# Patient Record
Sex: FEMALE | Race: WHITE | NOT HISPANIC OR LATINO | Employment: FULL TIME | URBAN - METROPOLITAN AREA
[De-identification: names, ages, dates, MRNs, and addresses within clinical notes are randomized per-mention and may not be internally consistent; named-entity substitution may affect disease eponyms.]

---

## 2017-01-12 ENCOUNTER — OFFICE VISIT (OUTPATIENT)
Dept: OBSTETRICS AND GYNECOLOGY | Facility: CLINIC | Age: 22
End: 2017-01-12

## 2017-01-12 VITALS
WEIGHT: 181 LBS | DIASTOLIC BLOOD PRESSURE: 70 MMHG | HEIGHT: 63 IN | BODY MASS INDEX: 32.07 KG/M2 | SYSTOLIC BLOOD PRESSURE: 116 MMHG

## 2017-01-12 DIAGNOSIS — N76.0 ACUTE VAGINITIS: Primary | ICD-10-CM

## 2017-01-12 PROCEDURE — 83986 ASSAY PH BODY FLUID NOS: CPT | Performed by: OBSTETRICS & GYNECOLOGY

## 2017-01-12 PROCEDURE — 99212 OFFICE O/P EST SF 10 MIN: CPT | Performed by: OBSTETRICS & GYNECOLOGY

## 2017-01-12 PROCEDURE — 87210 SMEAR WET MOUNT SALINE/INK: CPT | Performed by: OBSTETRICS & GYNECOLOGY

## 2017-01-12 RX ORDER — PAROXETINE 10 MG/1
10 TABLET, FILM COATED ORAL DAILY
Refills: 0 | COMMUNITY
Start: 2016-10-26 | End: 2017-05-04

## 2017-01-12 RX ORDER — METRONIDAZOLE 500 MG/1
500 TABLET ORAL 2 TIMES DAILY
Qty: 14 TABLET | Refills: 1 | Status: SHIPPED | OUTPATIENT
Start: 2017-01-12 | End: 2017-01-19

## 2017-01-12 NOTE — PROGRESS NOTES
"Subjective   Chief Complaint   Patient presents with   • Vaginal Discharge     with odor     Gayla Layne is a 22 y.o. year old  presenting to be seen for evaluation of an abnormal vaginal discharge. The discharge is watery, clear, yellow and odor. She wears a liner daily.  Her symptoms have been present for 8 month(s).  Additional she has noticed odor.    She is sexually active.  In the past 12 months there has not been new sexual partners.  Condoms are not typically used.  She would not like to be screened for STD's at today's exam.     Prior to the onset of symptoms she was not on systemic antibiotics.  She has not recently changed soaps/detergents/toilet tissue.  Prior to this visit, she has used flagyl and UTI antibiotic in an attempt to improve her symptoms. Flagyl helped in April but nopt recently    Current birth control method: IUD - Mirena. 2012 (del 2012)    Past 6 month menstrual and contraceptive history:    No LMP recorded. Patient has had an implant.  Cycle Frequency: absent  regular, predicable and consistent every 28 - 32 days   Menstrual cycle character:  spotting only   Cycle Duration: 1 - 2     {    The following portions of the patient's history were reviewed and updated as appropriate:problem list, current medications and allergies    Review of Systems  Normal bowels and bladder - occas itches     Objective   Visit Vitals   • /70   • Ht 63\" (160 cm)   • Wt 181 lb (82.1 kg)   • LMP Comment: Mirena   • BMI 32.06 kg/m2       General:  well developed; well nourished  no acute distress   Skin:  No suspicious lesions seen   Abdomen: soft, non-tender; no masses  no umbilical or inginual hernias are present  no hepato-splenomegaly   Pelvis: Clinical staff was present for exam  External genitalia:  normal appearance of the external genitalia including Bartholin's and [place]'s glands.  :  urethral meatus normal; urethral hypermobility is absent.  Vaginal:  normal pink " mucosa without prolapse or lesions. discharge present -  watery, white and PH is elevated and clue cells are noted microscopically there is an amine odor;  Cervix:  normal appearance.  Uterus:  normal size, shape and consistency.  Adnexa:  Her bladder is nontender  Rectal:  anus visually normal appearing.     Physical Exam    Lab Review   No data reviewed    Imaging   No data reviewed       Assessment   1. Bacterial vaginosis - chronic or recurrent.  I will treat her for a week and then ask her to use one pill weekly for about 14 weeks to see if this will relieve her symptoms.  Suggest condoms with intercourse.     Plan   1. Flagyl 500 twice a day #14 one refill    Medications Rx this encounter:  New Medications Ordered This Visit   Medications   • PARoxetine (PAXIL) 10 MG tablet     Sig: Take 10 mg by mouth Daily.     Refill:  0   • metroNIDAZOLE (FLAGYL) 500 MG tablet     Sig: Take 1 tablet by mouth 2 (Two) Times a Day for 7 days.     Dispense:  14 tablet     Refill:  1          This note was electronically signed.    Jerrod Muro MD  January 12, 2017

## 2017-01-12 NOTE — MR AVS SNAPSHOT
Gayla Layne   1/12/2017 3:20 PM   Office Visit    Dept Phone:  [phone]   Encounter #:  [id number]    Provider:  Jerrod Muro MD   Department:  [facility] [place]                Your Full Care Plan              Today's Medication Changes          These changes are accurate as of: 1/12/17  4:48 PM.  If you have any questions, ask your nurse or doctor.               New Medication(s)Ordered:     metroNIDAZOLE 500 MG tablet   Commonly known as:  FLAGYL   Take 1 tablet by mouth 2 (Two) Times a Day for 7 days.   Started by:  Jerrod Muro MD            Where to Get Your Medications      These medications were sent to [address] - [address] [place] [address] - [phone] [place] [id number] [address] [address]     Phone:  [phone]     metroNIDAZOLE 500 MG tablet                  Your Updated Medication List          This list is accurate as of: 1/12/17  4:48 PM.  Always use your most recent med list.                metroNIDAZOLE 500 MG tablet   Commonly known as:  FLAGYL   Take 1 tablet by mouth 2 (Two) Times a Day for 7 days.       PARoxetine 10 MG tablet   Commonly known as:  PAXIL               You Were Diagnosed With        Codes Comments    Acute vaginitis    -  Primary ICD-10-CM: N76.0  ICD-9-CM: 616.10       Instructions     None    Patient Instructions History      Upcoming Appointments     Visit Type Date Time Department    GYN FOLLOW UP 1/12/2017  3:20 PM [facility]    [facility]2017  [facility]      Wiren [employer] Signup     [facility] [employer] allows you to send messages to your doctor, view your test results, renew your prescriptions, schedule appointments, and more. To sign up, go to [employer] and click on the Sign Up Now link in the New User? box. Enter your [employer] Activation Code exactly as it appears below along with the last four digits of your Social Security  "Number and your Date of Birth () to complete the sign-up process. If you do not sign up before the expiration date, you must request a new code.    [employer] Activation Code: [email or web address]  Expires: 2017  4:48 PM    If you have questions, you can email [email or web address] or call [phone] to talk to our [employer] staff. Remember, [employer] is NOT to be used for urgent needs. For medical emergencies, dial 911.               Other Info from Your Visit           Your Appointments     2017  3:00 PM EST   Annual with Jerrod Muro MD   [facility] [place] (--)    [address] [place] [address] [address] [place] [id number]   [phone]              Allergies     Amoxicillin      Codeine      Zofran [Ondansetron Hcl]        Reason for Visit     Vaginal Discharge with odor      Vital Signs     Blood Pressure Height Weight Body Mass Index Smoking Status       116/70 63\" (160 cm) 181 lb (82.1 kg) 32.06 kg/m2 Never Smoker       Problems and Diagnoses Noted     Inflammation of vagina    -  Primary        "

## 2017-02-24 ENCOUNTER — OFFICE VISIT (OUTPATIENT)
Dept: OBSTETRICS AND GYNECOLOGY | Facility: CLINIC | Age: 22
End: 2017-02-24

## 2017-02-24 VITALS
WEIGHT: 177 LBS | BODY MASS INDEX: 31.36 KG/M2 | DIASTOLIC BLOOD PRESSURE: 60 MMHG | HEIGHT: 63 IN | SYSTOLIC BLOOD PRESSURE: 106 MMHG

## 2017-02-24 DIAGNOSIS — Z00.00 ANNUAL PHYSICAL EXAM: Primary | ICD-10-CM

## 2017-02-24 DIAGNOSIS — Z30.8 ENCOUNTER FOR OTHER CONTRACEPTIVE MANAGEMENT: ICD-10-CM

## 2017-02-24 PROBLEM — Z97.5 IUD (INTRAUTERINE DEVICE) IN PLACE: Status: ACTIVE | Noted: 2017-02-24

## 2017-02-24 PROCEDURE — 99395 PREV VISIT EST AGE 18-39: CPT | Performed by: OBSTETRICS & GYNECOLOGY

## 2017-02-24 NOTE — PROGRESS NOTES
"Subjective   Chief Complaint   Patient presents with   • Gynecologic Exam     still c/o's BV     Gayla Layne is a 22 y.o. year old  presenting to be seen for her annual exam.    Current birth control method: IUD - Mirena. Since  2012    No LMP recorded. Patient has had an implant.    She is sexually active.   Condoms are not typically used.      Past 6 month menstrual history:    Cycle Frequency: infrequent   More recently    Menstrual cycle character: flow is light   Cycle Duration: 1 - 2   Number of heavy days of flows: 0   Intermenstrual bleeding present: {no   Post-coital bleeding present: no     She exercises regularly: yes  .  Calcium intake: no. Less due to gallstones  She performs self breast exam:no.  She has concerns about domestic violence: no.    The following portions of the patient's history were reviewed and updated as appropriate:problem list, current medications, allergies, past family history, past medical history, past social history and past surgical history.    Review of Systems normal bladder and bowels + gallstones     Objective   Visit Vitals   • /60   • Ht 63\" (160 cm)   • Wt 177 lb (80.3 kg)   • LMP Comment: mirena    • BMI 31.35 kg/m2       General:  well developed; well nourished  no acute distress   Skin:  No suspicious lesions seen   Thyroid: not examined   Breasts:  Examined in supine position  Symmetric without masses or skin dimpling  Nipples normal without inversion, lesions or discharge  There are no palpable axillary nodes   Abdomen: soft, non-tender; no masses  no umbilical or inginual hernias are present  no hepato-splenomegaly   Pelvis: Clinical staff was present for exam  External genitalia:  normal appearance of the external genitalia including Bartholin's and [place]'s glands.  :  urethral meatus normal; urethral hypermobility is absent.  Vaginal:  normal pink mucosa without prolapse or lesions.  Cervix:  normal appearance. IUD string present - " 2.5 cms in length; pap  Uterus:  normal size, shape and consistency.  Adnexa:  normal bimanual exam of the adnexa.  Rectal:  digital rectal exam not performed; anus visually normal appearing.     Lab Review   No data reviewed    Imaging  No data reviewed       Assessment   1. Normal gynecologic examination  2. Mirena IUD in place since 2012 it will need replacement in March and April hopefully we can remove and replace on same day     Plan   1. Follow-up Pap testing  2. She has an appointment regarding possible cholecystectomy with Dr. Pierce  3. After that just taking care of we can have her come back to have her IUD removed and replaced    Medications Rx this encounter:  New Medications Ordered This Visit   Medications   • levonorgestrel (MIRENA, 52 MG,) 20 MCG/24HR IUD     Si each by Intrauterine route 1 (One) Time.           This note was electronically signed.    Jerrod Muro MD  2017

## 2017-03-07 ENCOUNTER — TELEPHONE (OUTPATIENT)
Dept: OBSTETRICS AND GYNECOLOGY | Facility: CLINIC | Age: 22
End: 2017-03-07

## 2017-04-21 ENCOUNTER — LAB REQUISITION (OUTPATIENT)
Dept: LAB | Facility: HOSPITAL | Age: 22
End: 2017-04-21

## 2017-04-21 DIAGNOSIS — K80.20 CALCULUS OF GALLBLADDER WITHOUT CHOLECYSTITIS WITHOUT OBSTRUCTION: ICD-10-CM

## 2017-04-21 LAB
ALBUMIN SERPL-MCNC: 4.7 G/DL (ref 3.2–4.8)
ALBUMIN/GLOB SERPL: 1.4 G/DL (ref 1.5–2.5)
ALP SERPL-CCNC: 68 U/L (ref 25–100)
ALT SERPL W P-5'-P-CCNC: 12 U/L (ref 7–40)
ANION GAP SERPL CALCULATED.3IONS-SCNC: 7 MMOL/L (ref 3–11)
AST SERPL-CCNC: 16 U/L (ref 0–33)
BILIRUB SERPL-MCNC: 0.5 MG/DL (ref 0.3–1.2)
BUN BLD-MCNC: 8 MG/DL (ref 9–23)
BUN/CREAT SERPL: 11.4 (ref 7–25)
CALCIUM SPEC-SCNC: 9.9 MG/DL (ref 8.7–10.4)
CHLORIDE SERPL-SCNC: 103 MMOL/L (ref 99–109)
CO2 SERPL-SCNC: 27 MMOL/L (ref 20–31)
CREAT BLD-MCNC: 0.7 MG/DL (ref 0.6–1.3)
DEPRECATED RDW RBC AUTO: 41.1 FL (ref 37–54)
ERYTHROCYTE [DISTWIDTH] IN BLOOD BY AUTOMATED COUNT: 12.7 % (ref 11.3–14.5)
GFR SERPL CREATININE-BSD FRML MDRD: 105 ML/MIN/1.73
GLOBULIN UR ELPH-MCNC: 3.3 GM/DL
GLUCOSE BLD-MCNC: 76 MG/DL (ref 70–100)
HCT VFR BLD AUTO: 41.3 % (ref 34.5–44)
HGB BLD-MCNC: 14 G/DL (ref 11.5–15.5)
MCH RBC QN AUTO: 30.2 PG (ref 27–31)
MCHC RBC AUTO-ENTMCNC: 33.9 G/DL (ref 32–36)
MCV RBC AUTO: 89.2 FL (ref 80–99)
PLATELET # BLD AUTO: 264 10*3/MM3 (ref 150–450)
PMV BLD AUTO: 10.7 FL (ref 6–12)
POTASSIUM BLD-SCNC: 3.5 MMOL/L (ref 3.5–5.5)
PROT SERPL-MCNC: 8 G/DL (ref 5.7–8.2)
RBC # BLD AUTO: 4.63 10*6/MM3 (ref 3.89–5.14)
SODIUM BLD-SCNC: 137 MMOL/L (ref 132–146)
WBC NRBC COR # BLD: 9.76 10*3/MM3 (ref 3.5–10.8)

## 2017-04-21 PROCEDURE — 80053 COMPREHEN METABOLIC PANEL: CPT | Performed by: SURGERY

## 2017-04-21 PROCEDURE — 85027 COMPLETE CBC AUTOMATED: CPT | Performed by: SURGERY

## 2017-04-24 ENCOUNTER — LAB REQUISITION (OUTPATIENT)
Dept: LAB | Facility: HOSPITAL | Age: 22
End: 2017-04-24

## 2017-04-24 DIAGNOSIS — K80.20 CALCULUS OF GALLBLADDER WITHOUT CHOLECYSTITIS WITHOUT OBSTRUCTION: ICD-10-CM

## 2017-04-24 PROCEDURE — 88304 TISSUE EXAM BY PATHOLOGIST: CPT | Performed by: SURGERY

## 2017-04-25 LAB
CYTO UR: NORMAL
LAB AP CASE REPORT: NORMAL
LAB AP CLINICAL INFORMATION: NORMAL
Lab: NORMAL
PATH REPORT.FINAL DX SPEC: NORMAL
PATH REPORT.GROSS SPEC: NORMAL

## 2017-05-04 ENCOUNTER — OFFICE VISIT (OUTPATIENT)
Dept: OBSTETRICS AND GYNECOLOGY | Facility: CLINIC | Age: 22
End: 2017-05-04

## 2017-05-04 VITALS
SYSTOLIC BLOOD PRESSURE: 100 MMHG | RESPIRATION RATE: 14 BRPM | HEIGHT: 63 IN | BODY MASS INDEX: 30.65 KG/M2 | WEIGHT: 173 LBS | DIASTOLIC BLOOD PRESSURE: 70 MMHG

## 2017-05-04 DIAGNOSIS — Z30.430 ENCOUNTER FOR INSERTION OF INTRAUTERINE CONTRACEPTIVE DEVICE: Primary | ICD-10-CM

## 2017-05-04 PROCEDURE — 58300 INSERT INTRAUTERINE DEVICE: CPT | Performed by: OBSTETRICS & GYNECOLOGY

## 2017-05-22 ENCOUNTER — TELEPHONE (OUTPATIENT)
Dept: OBSTETRICS AND GYNECOLOGY | Facility: CLINIC | Age: 22
End: 2017-05-22

## 2017-05-22 RX ORDER — PHENAZOPYRIDINE HYDROCHLORIDE 100 MG/1
100 TABLET, FILM COATED ORAL 3 TIMES DAILY PRN
Qty: 6 TABLET | Refills: 0 | Status: SHIPPED | OUTPATIENT
Start: 2017-05-22 | End: 2017-05-24

## 2017-06-15 ENCOUNTER — OFFICE VISIT (OUTPATIENT)
Dept: OBSTETRICS AND GYNECOLOGY | Facility: CLINIC | Age: 22
End: 2017-06-15

## 2017-06-15 VITALS
RESPIRATION RATE: 14 BRPM | SYSTOLIC BLOOD PRESSURE: 98 MMHG | DIASTOLIC BLOOD PRESSURE: 60 MMHG | BODY MASS INDEX: 30.29 KG/M2 | WEIGHT: 171 LBS

## 2017-06-15 DIAGNOSIS — Z30.431 IUD CHECK UP: Primary | ICD-10-CM

## 2017-06-15 PROCEDURE — 99212 OFFICE O/P EST SF 10 MIN: CPT | Performed by: OBSTETRICS & GYNECOLOGY

## 2017-06-15 RX ORDER — CITALOPRAM 20 MG/1
20 TABLET ORAL DAILY
COMMUNITY
End: 2018-11-28

## 2017-06-15 NOTE — PROGRESS NOTES
Subjective   Chief Complaint   Patient presents with   • Contraception     mirena placement check     Gayla Layne is a 22 y.o. year old .  No LMP recorded. Patient has had an implant.  She presents to be seen because of IUD check up. She had a UTI -seen in ER then 1* care 4 days later and changed to Bactrim from cipro and monodox after Rocephin at ER.  No symptoms now  Current birth control method: IUD - Mirena.    Past 6 month menstrual and contraceptive history:    No LMP recorded. Patient has had an implant.  Cycle Frequency: regular, predictable and consistent every 28 - 32 days  irregular   Menstrual cycle character: flow has been absent with the IUD   Cycle Duration: 0 - 0                   The following portions of the patient's history were reviewed and updated as appropriate:current medications, allergies and past surgical history    Review of Systems normal bladder now and bowels     Objective   BP 98/60  Resp 14  Wt 171 lb (77.6 kg)  BMI 30.29 kg/m2    General:  well developed; well nourished  no acute distress   Skin:  No suspicious lesions seen   Thyroid: normal to inspection and palpation   Lungs:  breathing is unlabored   Heart:  Not performed.   Abdomen: soft, non-tender; no masses  no umbilical or inginual hernias are present  no hepato-splenomegaly   Pelvis: Clinical staff was present for exam  External genitalia:  normal appearance of the external genitalia including Bartholin's and [place]'s glands.  :  urethral meatus normal; urethral hypermobility is absent.  Vaginal:  normal pink mucosa without prolapse or lesions. discharge present -  white;  Cervix:  normal appearance. IUD string present - 2.5 cms in length;  Uterus:  normal size, shape and consistency.  Adnexa:  normal bimanual exam of the adnexa.  Rectal:  digital rectal exam not performed; anus visually normal appearing.     Lab Review   No data reviewed    Imaging   Pelvic ultrasound report       Assessment   Mirena  IUD in place                         Recent UTI resolved     Plan   1. Reassured that on exam and ultrasound IUD is in place.  2. Plenty of fluids and regular voiding to hopefully avoid UTI.  3. Exercise calcium yearly exams    Medications Rx this encounter:  New Medications Ordered This Visit   Medications   • citalopram (CeleXA) 20 MG tablet     Sig: Take 20 mg by mouth Daily.          This note was electronically signed.    Jerrod Muro MD  German[place] 15, 2017

## 2018-08-26 ENCOUNTER — HOSPITAL ENCOUNTER (EMERGENCY)
Facility: HOSPITAL | Age: 23
Discharge: HOME | End: 2018-08-26
Attending: EMERGENCY MEDICINE | Admitting: EMERGENCY MEDICINE

## 2018-08-26 ENCOUNTER — APPOINTMENT (OUTPATIENT)
Dept: GENERAL RADIOLOGY | Facility: HOSPITAL | Age: 23
End: 2018-08-26

## 2018-08-26 VITALS
DIASTOLIC BLOOD PRESSURE: 78 MMHG | HEIGHT: 63 IN | TEMPERATURE: 98.9 F | WEIGHT: 180 LBS | BODY MASS INDEX: 31.89 KG/M2 | OXYGEN SATURATION: 100 % | HEART RATE: 114 BPM | RESPIRATION RATE: 20 BRPM | SYSTOLIC BLOOD PRESSURE: 128 MMHG

## 2018-08-26 DIAGNOSIS — S89.91XA RIGHT KNEE INJURY, INITIAL ENCOUNTER: Primary | ICD-10-CM
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
## 2018-08-26 DIAGNOSIS — S86.911A KNEE STRAIN, RIGHT, INITIAL ENCOUNTER: ICD-10-CM

## 2018-08-26 PROCEDURE — 99283 EMERGENCY DEPT VISIT LOW MDM: CPT

## 2018-08-26 PROCEDURE — 73560 X-RAY EXAM OF KNEE 1 OR 2: CPT

## 2018-08-26 PROCEDURE — 25010000002 KETOROLAC TROMETHAMINE PER 15 MG: Performed by: EMERGENCY MEDICINE

## 2018-08-26 PROCEDURE — 96372 THER/PROPH/DIAG INJ SC/IM: CPT

## 2018-08-26 RX ORDER — ACETAMINOPHEN 500 MG
1000 TABLET ORAL ONCE
Status: COMPLETED | OUTPATIENT
Start: 2018-08-26 | End: 2018-08-26

## 2018-08-26 RX ORDER — NAPROXEN 500 MG/1
500 TABLET ORAL 2 TIMES DAILY WITH MEALS
Qty: 14 TABLET | Refills: 0 | Status: SHIPPED | OUTPATIENT
Start: 2018-08-26 | End: 2018-11-28

## 2018-08-26 RX ORDER — KETOROLAC TROMETHAMINE 15 MG/ML
15 INJECTION, SOLUTION INTRAMUSCULAR; INTRAVENOUS ONCE
Status: COMPLETED | OUTPATIENT
Start: 2018-08-26 | End: 2018-08-26

## 2018-08-26 RX ADMIN — ACETAMINOPHEN 1000 MG: 500 TABLET, FILM COATED ORAL at 21:37

## 2018-08-26 RX ADMIN — KETOROLAC TROMETHAMINE 15 MG: 15 INJECTION, SOLUTION INTRAMUSCULAR; INTRAVENOUS at 21:37

## 2018-11-13 ENCOUNTER — TELEPHONE (OUTPATIENT)
Dept: OBSTETRICS AND GYNECOLOGY | Facility: CLINIC | Age: 23
End: 2018-11-13

## 2018-11-13 RX ORDER — FLUCONAZOLE 150 MG/1
150 TABLET ORAL DAILY
Qty: 1 TABLET | Refills: 0 | Status: SHIPPED | OUTPATIENT
Start: 2018-11-13 | End: 2018-11-28

## 2018-11-28 ENCOUNTER — OFFICE VISIT (OUTPATIENT)
Dept: OBSTETRICS AND GYNECOLOGY | Facility: CLINIC | Age: 23
End: 2018-11-28

## 2018-11-28 ENCOUNTER — APPOINTMENT (OUTPATIENT)
Dept: LAB | Facility: HOSPITAL | Age: 23
End: 2018-11-28

## 2018-11-28 VITALS
WEIGHT: 175 LBS | BODY MASS INDEX: 31.01 KG/M2 | DIASTOLIC BLOOD PRESSURE: 60 MMHG | SYSTOLIC BLOOD PRESSURE: 112 MMHG | HEIGHT: 63 IN

## 2018-11-28 DIAGNOSIS — A63.0 CONDYLOMA: ICD-10-CM

## 2018-11-28 DIAGNOSIS — Z20.2 EXPOSURE TO STD: ICD-10-CM

## 2018-11-28 DIAGNOSIS — Z01.419 ENCOUNTER FOR WELL WOMAN EXAM WITH ROUTINE GYNECOLOGICAL EXAM: Primary | ICD-10-CM

## 2018-11-28 DIAGNOSIS — Z97.5 IUD (INTRAUTERINE DEVICE) IN PLACE: ICD-10-CM

## 2018-11-28 PROBLEM — Z90.49 HX OF CHOLECYSTECTOMY: Status: ACTIVE | Noted: 2018-11-28

## 2018-11-28 LAB — HIV1+2 AB SER QL: NORMAL

## 2018-11-28 PROCEDURE — 86592 SYPHILIS TEST NON-TREP QUAL: CPT | Performed by: OBSTETRICS & GYNECOLOGY

## 2018-11-28 PROCEDURE — 36415 COLL VENOUS BLD VENIPUNCTURE: CPT | Performed by: OBSTETRICS & GYNECOLOGY

## 2018-11-28 PROCEDURE — 56501 DESTROY VULVA LESIONS SIM: CPT | Performed by: OBSTETRICS & GYNECOLOGY

## 2018-11-28 PROCEDURE — G0432 EIA HIV-1/HIV-2 SCREEN: HCPCS | Performed by: OBSTETRICS & GYNECOLOGY

## 2018-11-28 PROCEDURE — 99395 PREV VISIT EST AGE 18-39: CPT | Performed by: OBSTETRICS & GYNECOLOGY

## 2018-11-28 RX ORDER — PHENTERMINE HYDROCHLORIDE 37.5 MG/1
TABLET ORAL
Refills: 0 | COMMUNITY
Start: 2018-11-26 | End: 2019-09-30

## 2018-11-28 NOTE — PROGRESS NOTES
"Subjective   Chief Complaint   Patient presents with   • Annual Exam     vag discharge     Gayla Layne is a 23 y.o. year old  presenting to be seen for her annual exam.    Current birth control method: IUD - Mirena.    No LMP recorded. Patient has had an implant.    She is sexually active.   Condoms are not typically used.  She reports her ex-boyfriend said he was exposed to condyloma.  She had previously been together with him about 2 years and I believe had a child with him.  I then broke up he had been with other women and then they got back together again.  She reports that she is pretty much through with him.  She has noticed some bumps.  Last sexually active maybe\" August not entirely clear when these for showed up.  She had read online about condyloma.    Past 6 month menstrual history:             She has maybe 1 or 2 days of dark brown spotting monthly no cramping or pain.  With her first IUD she didn't really bleed at all wonders if this is normal.  Reassured her that it is.                            She exercises regularly: yes.  Calcium intake: is not} adequate.  Caffeine intake:small  She performs self breast exam:no.  She has concerns about domestic violence: no.    The following portions of the patient's history were reviewed and updated as appropriate:problem list, current medications, allergies, past family history, past medical history, past social history and past surgical history.    Review of Systems    normal bladder and bowels  Objective   /60   Ht 159.4 cm (62.75\")   Wt 79.4 kg (175 lb)   BMI 31.25 kg/m²      General:  well developed; well nourished  no acute distress  appears stated age   Skin:  No suspicious lesions seen   Thyroid:    Breasts:  Examined in supine position  Symmetric without masses or skin dimpling  Nipples normal without inversion, lesions or discharge  There are no palpable axillary nodes   Abdomen: no umbilical or inginual hernias are present  no " hepato-splenomegaly  Slightly tender right side but no masses.   Pelvis: Clinical staff was present for exam  External genitalia:  condyloma present Mainly in the perineum.  2 pigmented lesions right vulva and one single perianally at about 12:00.  All treated with bichloracetic acid covered with Vaseline;  :  urethral meatus normal;  Vaginal:  normal pink mucosa without prolapse or lesions.  Cervix:  normal appearance. IUD string present - 3.5 cms in length; Slight yellow brown mucous at os  Uterus:  normal size, shape and consistency.  Adnexa:  normal bimanual exam of the adnexa.  Rectal:  digital rectal exam not performed; anus visually normal appearing.     Lab Review   No data reviewed    Imaging  No data reviewed       Assessment   1. Normal GYN examination with exception of;  2. 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  We'll give her information regarding HPV.  We will do STD screening from her Pap testing in encourage HIV and RPR testing condom use in the future with new partners  3. IUD and placed a little over a year.  Spotting associated with this.     Plan   1. 1000 mg calcium in divided doses ideally in diet; regular exercise  2. Self breast awareness and mammogram protocols discussed.  3. Annual examination or sooner as needed  4. Follow-up Pap testing and STD screening.  5. When necessary for condyloma treatment if necessary could try Condylox      Medications Rx this encounter:  No orders of the defined types were placed in this encounter.         This note was electronically signed.    Jerrod Muro MD  11/28/2018

## 2018-11-30 LAB — RPR SER QL: NORMAL

## 2019-03-05 ENCOUNTER — TELEPHONE (OUTPATIENT)
Dept: OBSTETRICS AND GYNECOLOGY | Facility: CLINIC | Age: 24
End: 2019-03-05

## 2019-03-05 NOTE — TELEPHONE ENCOUNTER
PT I S CALLING ADVISING HER PHARMACY STATES THAT HER INSURANCE WONT PAY FOR THE CONDYLOX GEL THAT SHE NEEDS A PA - I ADVISED JAIDEN WOULD TAKE CARE OF THIS  SHE IS ALSO STATING THAT SHE NEEDS A SOLUTION TO GO WITH THIS - SHE IS NOT SURE WHAT THE NAME IS -

## 2019-03-11 ENCOUNTER — TELEPHONE (OUTPATIENT)
Dept: OBSTETRICS AND GYNECOLOGY | Facility: CLINIC | Age: 24
End: 2019-03-11

## 2019-03-12 ENCOUNTER — TELEPHONE (OUTPATIENT)
Dept: OBSTETRICS AND GYNECOLOGY | Facility: CLINIC | Age: 24
End: 2019-03-12

## 2019-03-12 RX ORDER — PODOFILOX 5 MG/ML
SOLUTION TOPICAL 2 TIMES DAILY
Qty: 1 EACH | Refills: 1 | Status: SHIPPED | OUTPATIENT
Start: 2019-03-12 | End: 2019-09-30

## 2019-09-19 ENCOUNTER — TELEPHONE (OUTPATIENT)
Dept: OBSTETRICS AND GYNECOLOGY | Facility: CLINIC | Age: 24
End: 2019-09-19

## 2019-09-30 ENCOUNTER — OFFICE VISIT (OUTPATIENT)
Dept: OBSTETRICS AND GYNECOLOGY | Facility: CLINIC | Age: 24
End: 2019-09-30

## 2019-09-30 VITALS
WEIGHT: 157 LBS | BODY MASS INDEX: 28.03 KG/M2 | DIASTOLIC BLOOD PRESSURE: 70 MMHG | SYSTOLIC BLOOD PRESSURE: 116 MMHG | RESPIRATION RATE: 16 BRPM

## 2019-09-30 DIAGNOSIS — Z97.5 IUD (INTRAUTERINE DEVICE) IN PLACE: ICD-10-CM

## 2019-09-30 DIAGNOSIS — A63.0 CONDYLOMA: ICD-10-CM

## 2019-09-30 DIAGNOSIS — N76.0 ACUTE VAGINITIS: Primary | ICD-10-CM

## 2019-09-30 PROCEDURE — 83986 ASSAY PH BODY FLUID NOS: CPT | Performed by: OBSTETRICS & GYNECOLOGY

## 2019-09-30 PROCEDURE — 99213 OFFICE O/P EST LOW 20 MIN: CPT | Performed by: OBSTETRICS & GYNECOLOGY

## 2019-09-30 PROCEDURE — 87210 SMEAR WET MOUNT SALINE/INK: CPT | Performed by: OBSTETRICS & GYNECOLOGY

## 2019-09-30 RX ORDER — BUSPIRONE HYDROCHLORIDE 7.5 MG/1
TABLET ORAL
Refills: 0 | COMMUNITY
Start: 2019-09-11 | End: 2020-03-03

## 2019-09-30 RX ORDER — METRONIDAZOLE 500 MG/1
500 TABLET ORAL 2 TIMES DAILY
Qty: 14 TABLET | Refills: 0 | Status: SHIPPED | OUTPATIENT
Start: 2019-09-30 | End: 2019-10-07

## 2019-09-30 NOTE — PROGRESS NOTES
Subjective   Chief Complaint   Patient presents with   • Vaginal Discharge   • Vaginal Itching   • Abdominal Pain     left side     Gayla Layne is a 24 y.o. year old  presenting to be seen for evaluation of an abnormal vaginal discharge. The discharge is watery and white.  Has a slight odor and itch is been there for about a week or so.  She is doing well with her IUD with minimal flow.  No real cramping.  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    She is sexually active.  In the past 12 months there has not been new sexual partners.  Condoms ARE typically used.  Having some pain may be at the introitus.  She did have a second-degree tear but that was 7 years ago  She may have 1 or 2 small condyloma.  These been treated with Condylox in the past.    Current birth control method: IUD - Mirena    No LMP recorded. Patient has had an implant.  Cycle Frequency: unpredictable   Menstrual cycle character: flow is typically light   Cycle Duration: 1 - 2                 The following portions of the patient's history were reviewed and updated as appropriate:problem list, current medications and allergies    Current Outpatient Medications:   •  busPIRone (BUSPAR) 7.5 MG tablet, , Disp: , Rfl: 0  •  levonorgestrel (MIRENA, 52 MG,) 20 MCG/24HR IUD, 1 each by Intrauterine route 1 (One) Time. , Disp: , Rfl:   •  metroNIDAZOLE (FLAGYL) 500 MG tablet, Take 1 tablet by mouth 2 (Two) Times a Day for 7 days., Disp: 14 tablet, Rfl: 0  Review of Systems normal bladdder and bowels     Objective   /70   Resp 16   Wt 71.2 kg (157 lb)   Breastfeeding? No   BMI 28.03 kg/m²     General:  well developed; well nourished  no acute distress  appears stated age   Skin:  No suspicious lesions seen   Abdomen: no umbilical or inguinal hernias are present  no hepato-splenomegaly   Soft sl tender on left side no rebound   Pelvis: Clinical staff was present for exam  External genitalia:  normal appearance  of the external genitalia including Bartholin's and [place]'s glands. There are couple of skin tags on the right labia and may be 2 very small condyloma on the perineum  :  urethral meatus normal;  Vaginal:  normal pink mucosa without prolapse or lesions. discharge present -  white and thick; pH = 5 wet prep done: clue cells are Present but mild;  Cervix:  normal appearance.  Uterus:  normal size, shape and consistency. anteverted;  Adnexa:  normal bimanual exam of the adnexa.   IUDs during present       Lab Review   Pap test and STD swabs for GC, chlamydia, trichimoniasis and HIV's RPR  Imaging   Pelvic ultrasound report       Assessment   1. Mild BV.  2. Mild condyloma could treat at home with Condylox follow-up at yearly in November     Plan   1. Follow-up November yearly    No orders of the defined types were placed in this encounter.    New Medications Ordered This Visit   Medications   • metroNIDAZOLE (FLAGYL) 500 MG tablet     Sig: Take 1 tablet by mouth 2 (Two) Times a Day for 7 days.     Dispense:  14 tablet     Refill:  0         This note was electronically signed.    Jerrod Muro MD  September 30, 2019

## 2019-10-10 RX ORDER — FLUCONAZOLE 150 MG/1
150 TABLET ORAL DAILY
Qty: 1 TABLET | Refills: 0 | Status: SHIPPED | OUTPATIENT
Start: 2019-10-10 | End: 2020-03-03

## 2020-03-03 ENCOUNTER — OFFICE VISIT (OUTPATIENT)
Dept: OBSTETRICS AND GYNECOLOGY | Facility: CLINIC | Age: 25
End: 2020-03-03

## 2020-03-03 VITALS
DIASTOLIC BLOOD PRESSURE: 70 MMHG | BODY MASS INDEX: 31.18 KG/M2 | WEIGHT: 176 LBS | HEIGHT: 63 IN | SYSTOLIC BLOOD PRESSURE: 112 MMHG

## 2020-03-03 DIAGNOSIS — A63.0 CONDYLOMA: ICD-10-CM

## 2020-03-03 DIAGNOSIS — Z01.411 ENCOUNTER FOR GYNECOLOGICAL EXAMINATION WITH ABNORMAL FINDING: Primary | ICD-10-CM

## 2020-03-03 PROBLEM — N91.1 AMENORRHEA, SECONDARY: Status: ACTIVE | Noted: 2020-03-03

## 2020-03-03 PROCEDURE — 56501 DESTROY VULVA LESIONS SIM: CPT | Performed by: OBSTETRICS & GYNECOLOGY

## 2020-03-03 PROCEDURE — 99395 PREV VISIT EST AGE 18-39: CPT | Performed by: OBSTETRICS & GYNECOLOGY

## 2020-03-03 RX ORDER — MONTELUKAST SODIUM 4 MG/1
TABLET, CHEWABLE ORAL
COMMUNITY
Start: 2020-02-18 | End: 2022-08-25 | Stop reason: SDUPTHER

## 2020-03-03 RX ORDER — BUSPIRONE HYDROCHLORIDE 10 MG/1
TABLET ORAL
COMMUNITY
Start: 2020-02-18 | End: 2021-03-04

## 2020-03-03 NOTE — PROGRESS NOTES
Subjective   Chief Complaint   Patient presents with   • Annual Exam   • Vaginal Discharge     Gayla Layne is a 25 y.o. year old  presenting to be seen for her annual exam.    Current birth control method: IUD - Mirena.  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  She did have some left lower quadrant pain and was wondering if this was ovulation.  Could be had asked her to chart her cycles to see if this recurs about once a month.  Other issues she has had some problems with bowel spasm or diarrhea after her gallbladder was removed in 2017.  Recently underwent a colonoscopy maybe endoscopy and has been placed on Colestid to help firm up her stool.  She is the Condylox after we treated her condyloma last visit.  She has had a few that persisted most when aware got smaller.  One in particular is bothersome wonders if these could be treated again today.    No LMP recorded. Patient has had an implant.    She is sexually active.   Condoms are not typically used.    Kerr is painful or she is having problems :no  She has concerns about domestic violence: no.    Cycle Frequency: absent                         She exercises regularly: yes.  Self breast awareness:not asked    Calcium intake: is not adequate.2  Caffeine intake: caffeine use is moderate-to-high daily  Social History    Tobacco Use      Smoking status: Never Smoker      Smokeless tobacco: Never Used    Social History     Substance and Sexual Activity   Alcohol Use No        The following portions of the patient's history were reviewed and updated as is  appropriate:problem list, current medications, allergies, past family history, past medical history, past social history and past surgical history.    Current Outpatient Medications:   •  busPIRone (BUSPAR) 10 MG tablet, , Disp: , Rfl:   •  colestipol (COLESTID) 1 g tablet, , Disp: , Rfl:   •  levonorgestrel (MIRENA, 52 MG,) 20 MCG/24HR IUD, 1 each by Intrauterine route 1 (One) Time. , Disp: , Rfl:  "    Review of systems  Constitutional    POS nothing reported                            NEG anorexia, night sweats or weight gain  Breast                POS breast tenderness on right                            NEG persistent breast lump, skin dimpling or nipple discharge  GI                      POS diarrhea and loose since GB removed - on colestid now                            NEG bloating, melena or reflux symptoms                       POS nothing reported                            NEG dysuria, frequency or hematuria       Objective   /70   Ht 159.4 cm (62.75\")   Wt 79.8 kg (176 lb)   Breastfeeding No   BMI 31.43 kg/m²       General:  well developed; well nourished  no acute distress  appears stated age   Skin:  No suspicious lesions seen   Thyroid:    Breasts:  Examined in supine position  Symmetric without masses or skin dimpling  Nipples normal without inversion, lesions or discharge   Abdomen: soft, non-tender; no masses  no umbilical or inguinal hernias are present  no hepato-splenomegaly   Pelvis: Clinical staff was present for exam  External genitalia:  condyloma present In the perineum.  About 4-5 of these 1 on the left side looks more like a skin tag.  Otherwise normal external genitalia;  :  urethral meatus normal;  Vaginal:  normal pink mucosa without prolapse or lesions.  Uterus:  normal size, shape and consistency.  Adnexa:  normal bimanual exam of the adnexa.  Rectal:  digital rectal exam not performed; anus visually normal appearing.  The above condyloma treated bichloracetic acid and covered with Vaseline     Lab Review   Pap test and STD swabs for GC, chlamydia and trichimoniasis HIV RPR    Imaging  Pelvic ultrasound report       Assessment     1. Normal examination with exception of  2. Perineal condyloma treated today with BCA  3. Mirena IUD in place with amenorrhea  4. Loose stools currently on Colestid             Plan     1. Annual examination or sooner as needed; if the " skin tag persist could consider local and excision with pencil cautery  2. 1000 mg calcium in divided doses ideally in diet; regular exercise  3. Return PRN above              No orders of the defined types were placed in this encounter.    No orders of the defined types were placed in this encounter.          This note was electronically signed.    Jerrod Muro MD  3/3/2020

## 2020-06-26 ENCOUNTER — TELEMEDICINE (OUTPATIENT)
Dept: FAMILY MEDICINE CLINIC | Facility: TELEHEALTH | Age: 25
End: 2020-06-26

## 2020-06-26 DIAGNOSIS — R10.2 PELVIC PAIN IN FEMALE: Primary | ICD-10-CM

## 2020-06-26 PROCEDURE — 99213 OFFICE O/P EST LOW 20 MIN: CPT | Performed by: NURSE PRACTITIONER

## 2020-06-26 NOTE — PROGRESS NOTES
Gayla Layne    1995  [id number]    I have reviewed the e-Visit questionnaire and patient's answers, my assessment and plan are as follows:    Questionnaire:   --symptoms: No COVID 19 symptoms  --exposure to flu or COVID-19 in past 14 days:  None`  --sudden loss of taste or smell:  None reported     Chief Complaint:  Pelvic pain     HPI  Gayla Layne is a 26 yo female who has complaints of stabbing pelvic pain that comes and goes and vaginal discharge that has increased and become malodorous over the past week. She does have an IUD and reports not pregnant and only one sexual partner.     Review of Systems - Genito-Urinary ROS: negative for - change in urinary stream or genital ulcers    No PE completed     Diagnoses and all orders for this visit:    Pelvic pain in female        Any medications prescribed have been sent electronically to   [id number] - [place], [place] - [address] AT [facility] - [phone]  - [phone]   [address] [place] [address]  Phone: [phone] Fax: [phone]      Time Documentation  Counseled patient  Counseling topics: She needs to be evaluated at the emergency department for pain.   Total encounter time: counseling time more than 50% of visit: 7 minutes    I have advised her to go to the emergency room for evaluation for the pelvic pain. She verbalizes understanding.     SHANTI Gallagher  06/26/20  6:15 PM    This is a Televisit.

## 2020-06-30 ENCOUNTER — TELEPHONE (OUTPATIENT)
Dept: OBSTETRICS AND GYNECOLOGY | Facility: CLINIC | Age: 25
End: 2020-06-30

## 2020-06-30 NOTE — TELEPHONE ENCOUNTER
My , Fátima Meier, called Gayla yesterday on June 29 to follow-up note I received regarding her complaining of some pelvic pain on the 26th.  She was urged to go the emergency room.  However, according to the Fátima, Gayla did not feel bad enough to go to the emergency room and her pain had improved.  Ask her to follow-up with me if this persists.

## 2021-01-06 ENCOUNTER — IMMUNIZATION (OUTPATIENT)
Dept: VACCINE CLINIC | Facility: HOSPITAL | Age: 26
End: 2021-01-06

## 2021-01-06 PROCEDURE — 0001A: CPT | Performed by: INTERNAL MEDICINE

## 2021-01-06 PROCEDURE — 91300 HC SARSCOV02 VAC 30MCG/0.3ML IM: CPT | Performed by: INTERNAL MEDICINE

## 2021-01-27 ENCOUNTER — IMMUNIZATION (OUTPATIENT)
Dept: VACCINE CLINIC | Facility: HOSPITAL | Age: 26
End: 2021-01-27

## 2021-01-27 PROCEDURE — 91300 HC SARSCOV02 VAC 30MCG/0.3ML IM: CPT | Performed by: INTERNAL MEDICINE

## 2021-01-27 PROCEDURE — 0002A: CPT | Performed by: INTERNAL MEDICINE

## 2021-03-04 ENCOUNTER — OFFICE VISIT (OUTPATIENT)
Dept: OBSTETRICS AND GYNECOLOGY | Facility: CLINIC | Age: 26
End: 2021-03-04

## 2021-03-04 VITALS
SYSTOLIC BLOOD PRESSURE: 110 MMHG | DIASTOLIC BLOOD PRESSURE: 60 MMHG | BODY MASS INDEX: 37.21 KG/M2 | HEIGHT: 63 IN | WEIGHT: 210 LBS

## 2021-03-04 DIAGNOSIS — Z01.419 ENCOUNTER FOR WELL WOMAN EXAM WITH ROUTINE GYNECOLOGICAL EXAM: Primary | ICD-10-CM

## 2021-03-04 PROCEDURE — 99395 PREV VISIT EST AGE 18-39: CPT | Performed by: OBSTETRICS & GYNECOLOGY

## 2021-03-04 RX ORDER — HYDROXYZINE HYDROCHLORIDE 10 MG/1
TABLET, FILM COATED ORAL
COMMUNITY
Start: 2020-12-24 | End: 2021-12-25

## 2021-03-04 RX ORDER — VALACYCLOVIR HYDROCHLORIDE 1 G/1
TABLET, FILM COATED ORAL
COMMUNITY
Start: 2021-02-21 | End: 2022-12-30

## 2021-03-04 RX ORDER — CARIPRAZINE 1.5 MG/1
CAPSULE, GELATIN COATED ORAL
COMMUNITY
Start: 2021-02-15 | End: 2022-12-30

## 2021-03-04 RX ORDER — LISDEXAMFETAMINE DIMESYLATE 30 MG/1
CAPSULE ORAL
COMMUNITY
Start: 2021-03-03 | End: 2021-04-01

## 2021-03-04 RX ORDER — PHENOL 1.4 %
600 AEROSOL, SPRAY (ML) MUCOUS MEMBRANE 2 TIMES DAILY
COMMUNITY
End: 2022-12-30

## 2021-03-04 NOTE — PROGRESS NOTES
Subjective   Chief Complaint   Patient presents with   • Annual Exam     Gayla Layne is a 26 y.o. year old  presenting to be seen for her annual exam.    Current birth control method: IUD - Mirena. 2017; discussed that the indication for birth control has been increased from 5 to 6 years of this will be good until .  She remembers never some discomfort during placement had usual alcohol etc.  She has had 1 vaginal delivery.  I suggest that we give her Cytotec and Motrin before her next IUD is removed and replaced.  Could consider a Kyleena which is a little smaller in diameter and effective for 5 years.    She is currently in nursing school.  She wondered about the cyst noted on her ultrasound with the after the placement I reviewed that and this appears to be a paraovarian cyst only 1.4 cm and I reassured her that this should not cause any significant pain.  She also had mildly polycystic appearing ovaries bilaterally they were not enlarged in size.    No LMP recorded. Patient has had an implant.    She is sexually active.   Condoms are not typically used.    STDs and sexual behavior discussed.  [place] is painful or she is having problems :no  She has concerns about domestic violence: no.    Cycle Frequency: unpredictable  irregular   Menstrual cycle character: flow is typically light   Cycle Duration: 0 - 1   Number of heavy days of flows: 0   Intermenstrual bleeding present: no   Post-coital bleeding present: no     She exercises regularly: no.  Discussed maintaining healthy weight and nutrition and injury avoidance  Self breast awareness:no    Calcium intake: is not adequate.1  Caffeine intake: caffeine use is mild to moderate depending on whether or not Starbucks coffee espresso.  Suggested having coffee at home to save money and less caffeine regarding breast tenderness.  Social History    Tobacco Use      Smoking status: Never Smoker      Smokeless tobacco: Never Used    Social  "History     Substance and Sexual Activity   Alcohol Use No      Discussed avoidance of illicit drugs    The following portions of the patient's history were reviewed and updated as is  appropriate:problem list, current medications, allergies, past family history, past medical history, past social history and past surgical history.    Current Outpatient Medications:   •  colestipol (COLESTID) 1 g tablet, , Disp: , Rfl:   •  hydrOXYzine (ATARAX) 10 MG tablet, TAKE 1 TO 2 TABLETS BY MOUTH TWICE DAILY AS NEEDED FOR ANXIETY, Disp: , Rfl:   •  levonorgestrel (MIRENA, 52 MG,) 20 MCG/24HR IUD, 1 each by Intrauterine route 1 (One) Time. 2012, Disp: , Rfl:   •  valACYclovir (VALTREX) 1000 MG tablet, TAKE 2 TABLETS TWICE DAILY BY MOUTH FOR 3 DAYS, Disp: , Rfl:   •  Vraylar 1.5 MG capsule capsule, TAKE 1 CAPSULE BY MOUTH EVERY MORNING AS DIRECTED, Disp: , Rfl:   •  Vyvanse 30 MG capsule, , Disp: , Rfl:     Discussed risk factors for hypertension, hyperlipidemia coronary heart disease.    Review of systems    Constitutional    POS weight gain                            NEG anorexia, chills, fatigue or night sweats  Breast                POS breast tenderness                            NEG persistent breast lump, skin dimpling or nipple discharge  GI                      POS constipation (chronic) and diarrhea                            NEG bloating, change in bowel habits, melena or reflux symptoms                       POS nothing reported                            NEG dysuria, frequency or hematuria         Objective   /60   Ht 158.8 cm (62.5\")   Wt 95.3 kg (210 lb)   Breastfeeding No   BMI 37.80 kg/m²       General:  well developed; well nourished  no acute distress  appears stated age   Skin:  No suspicious lesions seen   Thyroid:    Breasts:  Examined in supine position  Symmetric without masses or skin dimpling  Nipples normal without inversion, lesions or discharge  Fibrocystic changes are present both " breasts without a discrete mass   Abdomen: soft, non-tender; no masses  no umbilical or inguinal hernias are present  no hepato-splenomegaly   Pelvis: Clinical staff was present for exam  External genitalia:  normal appearance of the external genitalia including Bartholin's and Bentonia's glands.  :  urethral meatus normal;  Vaginal:  normal pink mucosa without prolapse or lesions.  Cervix:  normal appearance. Pap obtained IUD string present - 3 cms in length;  Uterus:  normal size, shape and consistency. anteverted;  Adnexa:  normal bimanual exam of the adnexa.  Rectal:  digital rectal exam not performed; anus visually normal appearing.       Lab Review   Pap test    Imaging  Pelvic ultrasound report  Pelvic ultrasound images independantly reviewed - Revealed normal placement of IUD and ovaries as described above               Assessment     1. Normal GYN examination  2. Hypomenorrhea associated with IUD  3. Weight gain since last year associated with binge eating possibly associate with stress of nursing school.  She is now on Vyvanse.             Plan     1. Annual examination or sooner as needed; follow-up Pap testing results, condoms if new partners.  2. 1000 mg calcium in divided doses ideally in diet; regular exercise  3. I would suggest she cut back on Starbucks coffee as this may be causing some of her breast tenderness  4.                No orders of the defined types were placed in this encounter.    No orders of the defined types were placed in this encounter.          This note was electronically signed.    Jerrod Muro MD  3/4/2021

## 2021-03-09 ENCOUNTER — TELEPHONE (OUTPATIENT)
Dept: OBSTETRICS AND GYNECOLOGY | Facility: CLINIC | Age: 26
End: 2021-03-09

## 2021-03-09 PROBLEM — R87.810 ATYPICAL SQUAMOUS CELL CHANGES OF UNDETERMINED SIGNIFICANCE (ASCUS) ON CERVICAL CYTOLOGY WITH POSITIVE HIGH RISK HUMAN PAPILLOMA VIRUS (HPV): Status: ACTIVE | Noted: 2021-03-09

## 2021-03-09 PROBLEM — R87.610 ATYPICAL SQUAMOUS CELL CHANGES OF UNDETERMINED SIGNIFICANCE (ASCUS) ON CERVICAL CYTOLOGY WITH POSITIVE HIGH RISK HUMAN PAPILLOMA VIRUS (HPV): Status: ACTIVE | Noted: 2021-03-09

## 2021-03-10 NOTE — TELEPHONE ENCOUNTER
Spoke with Gayla concerning her questions regarding her PAP results and Dr Muro's message via [employer]. Pt desires to proceed with Colpo. Scheduled for 4/1/21.

## 2021-04-01 ENCOUNTER — OFFICE VISIT (OUTPATIENT)
Dept: OBSTETRICS AND GYNECOLOGY | Facility: CLINIC | Age: 26
End: 2021-04-01

## 2021-04-01 VITALS
RESPIRATION RATE: 16 BRPM | DIASTOLIC BLOOD PRESSURE: 60 MMHG | SYSTOLIC BLOOD PRESSURE: 112 MMHG | WEIGHT: 200 LBS | BODY MASS INDEX: 36 KG/M2

## 2021-04-01 DIAGNOSIS — R87.610 ATYPICAL SQUAMOUS CELL CHANGES OF UNDETERMINED SIGNIFICANCE (ASCUS) ON CERVICAL CYTOLOGY WITH POSITIVE HIGH RISK HUMAN PAPILLOMA VIRUS (HPV): ICD-10-CM

## 2021-04-01 DIAGNOSIS — R87.810 ATYPICAL SQUAMOUS CELL CHANGES OF UNDETERMINED SIGNIFICANCE (ASCUS) ON CERVICAL CYTOLOGY WITH POSITIVE HIGH RISK HUMAN PAPILLOMA VIRUS (HPV): ICD-10-CM

## 2021-04-01 PROCEDURE — 57452 EXAM OF CERVIX W/SCOPE: CPT | Performed by: OBSTETRICS & GYNECOLOGY

## 2021-04-01 RX ORDER — LISDEXAMFETAMINE DIMESYLATE 40 MG/1
40 CAPSULE ORAL EVERY MORNING
COMMUNITY
Start: 2021-03-25 | End: 2022-12-30

## 2021-04-01 RX ORDER — PHENTERMINE HYDROCHLORIDE 37.5 MG/1
TABLET ORAL
COMMUNITY
Start: 2021-03-30 | End: 2022-12-30

## 2021-04-01 NOTE — PROGRESS NOTES
Colposcopy    Date of procedure:  4/1/2021    Risks and benefits discussed? yes  All questions answered? yes  Consents given by the patient  Written consent obtained? yes      Pre-op indication: ASCUS with POSITIVE high risk HPV nonsixteen/18; discussed this is less likely to lead to precancerous changes than 16 or 18  Patient questions/history:   Procedure documentation:    The cervix was viewed colposcopically with and without a green filter.  The cervix was  bathed in acetic acid.   The findings were as follows:      The transformation zone was able to be seen adequately.    No visible lesions    Ectocervical biopsies were not taken .  Silver nitrate was applied to a bleeding site at about 6:00 unrelated to any abnormality.  Sites if indicated. Monsel's was not needed for hemostasis.      The endocervical speculum was used.  An ECC and/or Cytobrush biopsy was not performed.         Colposcopic Impression: 1. Adequate colposcopy  2. Colposcopic findings are consistent with PAP  3. IUD string 3 cm in length             Plan:                                              1.  She can either return for Pap testing next year or I would think that 2 years would be sufficient given normal findings today                                                                                                                                                                                                                         This note was electronically signed.    Jerrod Muro M.D.  April 1, 2021

## 2021-12-25 ENCOUNTER — TELEMEDICINE (OUTPATIENT)
Dept: FAMILY MEDICINE CLINIC | Facility: TELEHEALTH | Age: 26
End: 2021-12-25

## 2021-12-25 DIAGNOSIS — J98.8 VIRAL RESPIRATORY ILLNESS: Primary | ICD-10-CM

## 2021-12-25 DIAGNOSIS — B97.89 VIRAL RESPIRATORY ILLNESS: Primary | ICD-10-CM

## 2021-12-25 PROCEDURE — BHEMPVIDEOVISIT: Performed by: NURSE PRACTITIONER

## 2021-12-25 PROCEDURE — 99213 OFFICE O/P EST LOW 20 MIN: CPT | Performed by: NURSE PRACTITIONER

## 2021-12-25 NOTE — PATIENT INSTRUCTIONS
As discussed, you will still need to fill out a survey to come into work and if you get a red X, you cannot work tonight. You have already done your virtual visit. You will need a COVID-19 test, but since there are no urgent care facilities close to you, you can schedule a pcr test at the closest pharmacy. The pharmacies are likely closed today, but go ahead and schedule for You will need to do a return to work survey.   If you have any questions, please sent me an non-urgent medical question or patient advice today.   Symptoms may worsen over the next several days.   I have included a COVID-19 test. Go to your nearest [facility] for testing. Tell them you have already been seen virtually and only need testing   We will notify you of your COVID-19 results by phone. You will receive a call from an unknown or blocked number. You can also get your results on your [employer] jose ramon.  You will need to remain quarantined for 10 days from onset of symptoms and only to discontinue if symptoms have improved and no fever for 24 hours without the use of fever reducing medications such as Tylenol (acetaminophen), Advil (ibuprfen), or Aleve (naproxen).  Continue to wear a mask for 14 days or until symptoms resolve. If symptoms worsen, go to Urgent Care or Emergency Department for care.   Symptoms of Coronavirus are treated just as you would for any viral illness. Take Tylenol and/or Ibuprofen for pain and/or fever, you may find it better to alternate these every 4 hours, so that you are only taking each every 8 hours. Stay hydrated, rest and you may treat cough with over-the-counter cough syrup such as Robitussin.  If your symptoms do not improve, symptoms change or do not fully resolve, seek further evaluation with your primary care provider or Urgent Care. If you develop severe symptoms, such as chest pain, high fever, difficulty breathing, difficulty urinating or have any symptoms that interfere with your ability to  function go to the nearest Emergency Department immediately.   3 Key Steps to Take While Waiting for Your COVID-19 Test Result  To help stop the spread of COVID-19, take these 3 key steps NOW while waiting for your test results:  1. Stay home and monitor your health.  Stay home and monitor your health to help protect your friends, family, and others from possibly getting COVID-19 from you.  Stay home and away from others:  · If possible, stay away from others, especially people who are at higher risk for getting very sick from COVID-19, such as older adults and people with other medical conditions.  · If you have been in contact with someone with COVID-19, stay home and away from others for 14 days after your last contact with that person. Follow the recommendations of your local public health department if you need to quarantine.  · If you have a fever, cough or other symptoms of COVID-19, stay home and away from others (except to get medical care).  Monitor your health:  · Watch for fever, cough, shortness of breath, or other symptoms of COVID-19. Remember, symptoms may appear 2-14 days after exposure to COVID-19 and can include:  ? Fever or chills  ? Cough  ? Shortness of breath or difficulty breathing  ? Tiredness  ? Muscle or body aches  ? Headache  ? New loss of taste or smell  ? Sore throat  ? Congestion or runny nose  ? Nausea or vomiting  ? Diarrhea  2. Think about the people you have recently been around.  If you are diagnosed with COVID-19, a public health worker may call you to check on your health, discuss who you have been around, and ask where you spent time while you may have been able to spread COVID-19 to others. While you wait for your COVID-19 test result, think about everyone you have been around recently. This will be important information to give health workers if your test is positive.   Complete the information on the back of this page to help you remember everyone you have been around.   3.  Answer the phone call from the health department.  If a public health worker calls you, answer the call to help slow the spread of COVID-19 in your community.  · Discussions with health department staff are confidential. This means that your personal and medical information will be kept private and only shared with those who may need to know, like your health care provider.  · Your name will not be shared with those you came in contact with. The health department will only notify people you were in close contact with (within 6 feet for more than 15 minutes) that they might have been exposed to COVID-19.  Think about the people you have recently been around  If you test positive and are diagnosed with COVID-19, someone from the health department may call to check-in on your health, discuss who you have been around, and ask where you spent time while you may have been able to spread COVID-19 to others. This form can help you think about people you have recently been around so you will be ready if a public health worker calls you.  Things to think about. Have you:  · Gone to work or school?  · Gotten together with others (eaten out at a restaurant, gone out for drinks, exercised with others or gone to a gym, had friends or family over to your house, volunteered, gone to a party, pool, or park)?  · Gone to a store in person (e.g., grocery store, mall)?  · Gone to in-person appointments (e.g., salon, alegria, doctor's or dentist's office)?  · Ridden in a car with others (e.g., rideshare) or taken public transportation?  · Been inside a Religion, Restoration, Restorationist or other places of Yarsanism?  Who lives with you?  · ______________________________________________________________________  · ______________________________________________________________________  · ______________________________________________________________________  · ______________________________________________________________________  Who have you been  around (less than 6 feet for a total of 15 minutes or more) in the last 10 days? (You may have more people to list than the space provided. If so, write on the front of this sheet or a separate piece of paper.)  Name ______________________________________________  · Phone number ____________________________________  · Date you last saw them _____________________________  · Where you last saw them ________________________________________________  Name ______________________________________________  · Phone number ____________________________________  · Date you last saw them _____________________________  · Where you last saw them ________________________________________________  Name ______________________________________________  · Phone number ____________________________________  · Date you last saw them _____________________________  · Where you last saw them ________________________________________________  Name ______________________________________________  · Phone number ____________________________________  · Date you last saw them _____________________________  · Where you last saw them ________________________________________________  Name ______________________________________________  · Phone number ____________________________________  · Date you last saw them _____________________________  · Where you last saw them ________________________________________________  What have you done in the last 10 days with other people?  Activity _____________________________________________  · Location _________________________________________  · Date ____________________________________________  Activity _____________________________________________  · Location _________________________________________  · Date ____________________________________________  Activity _____________________________________________  · Location _________________________________________  · Date  ____________________________________________  Activity _____________________________________________  · Location _________________________________________  · Date ____________________________________________  Activity _____________________________________________  · Location _________________________________________  · Date ____________________________________________  Centers for Disease Control and Prevention  cdc.gov/coronavirus  07/09/2021  This information is not intended to replace advice given to you by your health care provider. Make sure you discuss any questions you have with your health care provider.  Document Revised: 07/14/2021 Document Reviewed: 07/14/2021  Elsevier Patient Education © 2021 Elsevier Inc.

## 2021-12-25 NOTE — PROGRESS NOTES
You have chosen to receive care through a telehealth visit.  Do you consent to use a video/audio connection for your medical care today? Yes     CHIEF COMPLAINT  No chief complaint on file.        HPI  Gayla Layne is a 26 y.o. female  presents with complaint of cold symptoms for several days. 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     Review of Systems   Constitutional: Positive for chills. Negative for diaphoresis, fatigue and fever.   HENT: Positive for congestion, rhinorrhea, sneezing and sore throat. Negative for sinus pressure and sinus pain.    Respiratory: Positive for cough (dry ). Negative for shortness of breath and wheezing.    Cardiovascular: Negative for chest pain.   Gastrointestinal: Negative for diarrhea, nausea and vomiting.   Musculoskeletal: Positive for myalgias.   Neurological: Negative for headaches.   Hematological: Negative for adenopathy.       Past Medical History:   Diagnosis Date   • Anxiety    • Bipolar 2 disorder (CMS/HCC)    • Depression        Family History   Problem Relation Age of Onset   • Breast cancer Maternal Grandmother    • Osteoporosis Maternal Grandmother    • COPD Mother    • Asthma Mother    • Diabetes type II Mother    • No Known Problems Father         estranged       Social History     Socioeconomic History   • Marital status: Single   Tobacco Use   • Smoking status: Never Smoker   • Smokeless tobacco: Never Used   Substance and Sexual Activity   • Alcohol use: No   • Drug use: No   • Sexual activity: Yes     Partners: Male     Birth control/protection: I.U.D.         There were no vitals taken for this visit.    PHYSICAL EXAM  Physical Exam   Constitutional: She is oriented to person, place, and time. She appears well-developed and well-nourished. She does not have a sickly appearance. She does not appear ill. No distress.   HENT:   Head: Normocephalic and atraumatic.   Eyes: EOM are  normal.   Neck: Neck normal appearance.  Pulmonary/Chest: Effort normal.  No respiratory distress.  Neurological: She is alert and oriented to person, place, and time.   Skin: Skin is dry.   Psychiatric: She has a normal mood and affect.         Diagnoses and all orders for this visit:    1. Viral respiratory illness (Primary)  -     COVID-19 PCR, [facility], NP SWAB IN LEXAR VIRAL TRANSPORT MEDIA/ORAL SWISH 24-30 HR TAT - Swab, Nasopharynx; Future      COVID-19 (PFIZER) 1/27/2021, 1/6/2021   Okay to get pcr testing from local pharmacy. The pharmacies are likely closed today, but go ahead and schedule for tomorrow. I have included a test as well.           FOLLOW-UP  As discussed during visit with PCP/[facility] if no improvement or Urgent Care/Emergency Department if worsening of symptoms    Patient verbalizes understanding of medication dosage, comfort measures, instructions for treatment and follow-up.    SHANTI Gallagher  12/25/2021  09:08 EST    This visit was performed via Telehealth.  This patient has been instructed to follow-up with their primary care provider if their symptoms worsen or the treatment provided does not resolve their illness.

## 2022-05-13 NOTE — PATIENT INSTRUCTIONS
You need to go to the emergency room for evaluation      Pelvic Pain, Female  Pelvic pain is pain in your lower abdomen, below your belly button and between your hips. The pain may start suddenly (be acute), keep coming back (be recurring), or last a long time (become chronic). Pelvic pain that lasts longer than 6 months is considered chronic.  Pelvic pain may affect your:  · Reproductive organs.  · Urinary system.  · Digestive tract.  · Musculoskeletal system.  There are many potential causes of pelvic pain. Sometimes, the pain can be a result of digestive or urinary conditions, strained muscles or ligaments, or reproductive conditions. Sometimes the cause of pelvic pain is not known.  Follow these instructions at home:    · Take over-the-counter and prescription medicines only as told by your health care provider.  · Rest as told by your health care provider.  · Do not have sex if it hurts.  · Keep a journal of your pelvic pain. Write down:  ? When the pain started.  ? Where the pain is located.  ? What seems to make the pain better or worse, such as food or your period (menstrual cycle).  ? Any symptoms you have along with the pain.  · Keep all follow-up visits as told by your health care provider. This is important.  Contact a health care provider if:  · Medicine does not help your pain.  · Your pain comes back.  · You have new symptoms.  · You have abnormal vaginal discharge or bleeding, including bleeding after menopause.  · You have a fever or chills.  · You are constipated.  · You have blood in your urine or stool.  · You have foul-smelling urine.  · You feel weak or light-headed.  Get help right away if:  · You have sudden severe pain.  · Your pain gets steadily worse.  · You have severe pain along with fever, nausea, vomiting, or excessive sweating.  · You lose consciousness.  Summary  · Pelvic pain is pain in your lower abdomen, below your belly button and between your hips.  · There are many potential  causes of pelvic pain.  · Keep a journal of your pelvic pain.  This information is not intended to replace advice given to you by your health care provider. Make sure you discuss any questions you have with your health care provider.  Document Released: 11/14/2005 Document Revised: 06/05/2019 Document Reviewed: 06/05/2019  Elsevier Patient Education © 2020 Elsevier Inc.      Symptoms

## 2022-08-25 ENCOUNTER — TELEPHONE (OUTPATIENT)
Dept: FAMILY MEDICINE CLINIC | Facility: CLINIC | Age: 27
End: 2022-08-25

## 2022-08-25 DIAGNOSIS — R19.7 DIARRHEA, UNSPECIFIED TYPE: Primary | ICD-10-CM

## 2022-08-25 RX ORDER — MONTELUKAST SODIUM 4 MG/1
1 TABLET, CHEWABLE ORAL 2 TIMES DAILY
Qty: 60 TABLET | Refills: 0 | Status: SHIPPED | OUTPATIENT
Start: 2022-08-25 | End: 2022-10-17 | Stop reason: SDUPTHER

## 2022-08-25 NOTE — TELEPHONE ENCOUNTER
I have spoke with Dr. Lan. He has approved for this to be sent in with no refills. She will need to keep the appt that she made due to filling before and then no showing or the appt. I have let her know this. TF

## 2022-08-25 NOTE — TELEPHONE ENCOUNTER
Caller: Gayla Layne    Relationship: Self    Best call back number: [phone]    Requested Prescriptions:   Requested Prescriptions     Pending Prescriptions Disp Refills   • colestipol (COLESTID) 1 g tablet          Pharmacy where request should be sent: [facility] #[id number] - [facility] [place] - [address]  AT [facility] [address] - [phone]  - [phone]     Additional details provided by patient: PATIENT HAS AN APPOINTMENT ON 9.12 PLEASE REFILL UNTIL THEN. PLEASE CALL IF THERE ARE ANY ISSUES.    Does the patient have less than a 3 day supply:  [x] Yes  [] No    Mervat Xavier Rep   08/25/22 12:34 EDT

## 2022-09-12 ENCOUNTER — TELEPHONE (OUTPATIENT)
Dept: FAMILY MEDICINE CLINIC | Facility: CLINIC | Age: 27
End: 2022-09-12

## 2022-09-12 NOTE — TELEPHONE ENCOUNTER
Called and spoke with patient and advised her that Paxlovid is for High risk and she states she is not high risk. Advised her to treat with OTC medicines and if she has any other questions or concerns to call our office. She verbalized understanding

## 2022-09-12 NOTE — TELEPHONE ENCOUNTER
Caller: Gayla Layne    Relationship: Self    Best call back number: [phone]    What medication are you requesting: PAXLOVID    What are your current symptoms: SORE THROAT, COUGH, BODY ACHES, FATIGUE, FEVER, DIARRHEA    How long have you been experiencing symptoms: 09.09.22    Have you had these symptoms before:    [] Yes  [x] No    Have you been treated for these symptoms before:   [] Yes  [x] No    If a prescription is needed, what is your preferred pharmacy and phone number: [facility] #[id number] - [place], [place] - [address]  AT [facility] [address] - [phone]  - [phone] FX     Additional notes:   PATIENT TESTED POSITIVE FOR COVID ON Friday 09.09.22

## 2022-10-02 DIAGNOSIS — R19.7 DIARRHEA, UNSPECIFIED TYPE: ICD-10-CM

## 2022-10-03 RX ORDER — MONTELUKAST SODIUM 4 MG/1
TABLET, CHEWABLE ORAL
Qty: 60 TABLET | Refills: 0 | OUTPATIENT
Start: 2022-10-03

## 2022-10-14 DIAGNOSIS — R19.7 DIARRHEA, UNSPECIFIED TYPE: ICD-10-CM

## 2022-10-14 RX ORDER — MONTELUKAST SODIUM 4 MG/1
1 TABLET, CHEWABLE ORAL 2 TIMES DAILY
Qty: 60 TABLET | Refills: 0 | OUTPATIENT
Start: 2022-10-14

## 2022-10-14 NOTE — TELEPHONE ENCOUNTER
Caller: Gayla Layne    Relationship: Self    Best call back number: [phone]    Requested Prescriptions:   Requested Prescriptions     Pending Prescriptions Disp Refills   • colestipol (COLESTID) 1 g tablet 60 tablet 0     Sig: Take 1 tablet by mouth 2 (Two) Times a Day.        Pharmacy where request should be sent: [facility]     Additional details provided by patient: PATIENT HAS APT WITH CHELA DIAZ    Does the patient have less than a 3 day supply:  [x] Yes  [] No    Mervat Viera Rep   10/14/22 10:30 EDT

## 2022-10-14 NOTE — TELEPHONE ENCOUNTER
We have gotten a refill request for her rx colestipol. I am denying her request due to having spoke with her on 8/25/2022 regarding needing an appt. She has made the appts twice now and I have filled and than she has canceled those appt after the rx is filled. TF  Dr. Lan has said she will need to be seen before filling due to the above reason for the denial. TF

## 2022-10-17 ENCOUNTER — TELEPHONE (OUTPATIENT)
Dept: FAMILY MEDICINE CLINIC | Facility: CLINIC | Age: 27
End: 2022-10-17

## 2022-10-17 DIAGNOSIS — R19.7 DIARRHEA, UNSPECIFIED TYPE: ICD-10-CM

## 2022-10-17 RX ORDER — MONTELUKAST SODIUM 4 MG/1
1 TABLET, CHEWABLE ORAL 2 TIMES DAILY
Qty: 60 TABLET | Refills: 2 | Status: SHIPPED | OUTPATIENT
Start: 2022-10-17 | End: 2022-12-30

## 2022-10-17 NOTE — TELEPHONE ENCOUNTER
Caller: Gayla Layne    Relationship to patient: Self    Best call back number: [phone]    What is the call regarding:  PATIENT REQUESTED COLESTID LAST WEEK, BUT WAS TOLD SHE HAD TO MAKE AN APPOINTMENT, SO SHE DID ON 12/23/22.  SHE IS WANTING TO KNOW IF SHE COULD GET MEDICATION UNTIL THEN AS SHE HAS FOUR DAYS LEFT.  PLEASE ADVISE.   Report received from JESUS Rushing. Plan of care reviewed with patient, denies any questions. No needs voiced at this time. Call light within reach, bed locked and in lowest position.  Patient drinking prep for colonoscopy.

## 2022-12-30 ENCOUNTER — OFFICE VISIT (OUTPATIENT)
Dept: FAMILY MEDICINE CLINIC | Facility: CLINIC | Age: 27
End: 2022-12-30

## 2022-12-30 VITALS
SYSTOLIC BLOOD PRESSURE: 122 MMHG | HEART RATE: 94 BPM | HEIGHT: 63 IN | WEIGHT: 250.4 LBS | DIASTOLIC BLOOD PRESSURE: 76 MMHG | BODY MASS INDEX: 44.37 KG/M2 | RESPIRATION RATE: 18 BRPM | OXYGEN SATURATION: 98 % | TEMPERATURE: 96.8 F

## 2022-12-30 DIAGNOSIS — E66.01 MORBID (SEVERE) OBESITY DUE TO EXCESS CALORIES: ICD-10-CM

## 2022-12-30 DIAGNOSIS — F98.8 ATTENTION DEFICIT DISORDER, UNSPECIFIED HYPERACTIVITY PRESENCE: ICD-10-CM

## 2022-12-30 DIAGNOSIS — Z00.00 ANNUAL PHYSICAL EXAM: ICD-10-CM

## 2022-12-30 DIAGNOSIS — F31.81 BIPOLAR 2 DISORDER: Primary | ICD-10-CM

## 2022-12-30 DIAGNOSIS — F51.01 PRIMARY INSOMNIA: ICD-10-CM

## 2022-12-30 PROCEDURE — 36415 COLL VENOUS BLD VENIPUNCTURE: CPT | Performed by: NURSE PRACTITIONER

## 2022-12-30 PROCEDURE — 99395 PREV VISIT EST AGE 18-39: CPT | Performed by: NURSE PRACTITIONER

## 2022-12-30 RX ORDER — MONTELUKAST SODIUM 4 MG/1
1 TABLET, CHEWABLE ORAL 2 TIMES DAILY
COMMUNITY

## 2022-12-30 NOTE — ASSESSMENT & PLAN NOTE
Patient's (Body mass index is 45.07 kg/m².) indicates that they are morbidly obese (BMI > 40 or > 35 with obesity - related health condition) with health conditions that include none . Weight is worsening. BMI is is above average; BMI management plan is completed. We discussed portion control and increasing exercise.  She voices frustration that she feels like she doesn't eat very much and continues to gain wait, but she also admittedly binge eats some days which she is working on with her mental health professional. She has taken phentermine in the past with some good results and would like to revisit that option.   We will obtain blood work today and if all is normal will proceed with initiation of phentermine.

## 2022-12-30 NOTE — ASSESSMENT & PLAN NOTE
Psychological condition is improving with treatment.  Continue current treatment regimen.  Psychological condition  will be reassessed at the next regular appointment.  She is followed by AYDE Plascencia As well as a therapist. She feels her symptoms are currently well controlled on current medications which include vraylar

## 2022-12-30 NOTE — PROGRESS NOTES
Annual Physical     Name: Gayla Layne    : 1995     MRN: [id number]     Chief Complaint  follow up medicine     Subjective     History of Present Illness:  Gayla Layne is a 27 y.o. female who presents today for annual physical exam. She has medical conditions including Bipolar 2 disorder, ADHD, IBS and obesity. She is under the psychiatric care of SHANTI Plascencia, she also sees a therapist regularly. She has been evaluated by Dr. Hughes for IBS, undergoing EGD and colonoscopy in 2020. Her diarrhea is well controlled on current colestipol dosing. Her last cervical cancer screening was last year under the care of Dr. Muro who has since retired. She plans on establishing care with Dr. Vazquez for GYN services. She does voice some frustration that she feels she doesn't eat that much but is unable to lose weight. She has taken phentermine in the past and feels it was helpful. She has no issues with elevated BP or heart palpitations. 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 She has no further complaints or concerns today.    The patient is being seen for a health maintenance evaluation.    Social History  Tobacco Use: Low Risk    • Smoking Tobacco Use: Never   • Smokeless Tobacco Use: Never   • Passive Exposure: Not on file     Social History     Socioeconomic History   • Marital status: Single   Tobacco Use   • Smoking status: Never   • Smokeless tobacco: Never   Substance and Sexual Activity   • Alcohol use: No   • Drug use: No   • Sexual activity: Yes     Partners: Male     Birth control/protection: I.U.D.       General History  Immunizations are not up to date. The patient needs the following immunizations: COVID booster, TDAP    Lifestyle  Gayla  consumes in  "general, an \"unhealthy\" diet.  She exercises never.    Reproductive Health  Gayla  is premenopausal.  She reports periods are rare.  She is sexually active. Her contraceptive plan is IUD.    Screening  Last pap was   Last Completed Pap Smear          PAP SMEAR (Every 3 Years) Next due on 3/4/2024    03/04/2021  SCANNED - PAP SMEAR    11/28/2018  SCANNED - PAP SMEAR    02/24/2017  SCANNED - PAP SMEAR    01/08/2016  SCANNED - PAP SMEAR            . History of abnormal pap smear or family history of gyn cancer: yes    Last colonoscopy was 02/2020  Last Completed Colonoscopy     This patient has no relevant Health Maintenance data.        Health Maintenance Summary          Overdue - TDAP/TD VACCINES (2 - Tdap) Overdue since 6/19/2016 06/19/2006  Imm Admin: Td          Ordered - HEPATITIS C SCREENING (Once) Ordered on 12/30/2022    No completion, postpone, or frequency change history exists for this topic.          Overdue - COVID-19 Vaccine (4 - Booster for Pfizer series) Overdue since 3/11/2022    01/14/2022  Imm Admin: COVID-19 (PFIZER) PURPLE CAP    01/27/2021  Imm Admin: COVID-19 (PFIZER) PURPLE CAP    01/06/2021  Imm Admin: COVID-19 (PFIZER) PURPLE CAP          ANNUAL PHYSICAL (Yearly) Next due on 12/30/2023 12/30/2022  Done    02/24/2017  Registry Metric: Last Annual Physical          PAP SMEAR (Every 3 Years) Next due on 3/4/2024    03/04/2021  SCANNED - PAP SMEAR    11/28/2018  SCANNED - PAP SMEAR    02/24/2017  SCANNED - PAP SMEAR    01/08/2016  SCANNED - PAP SMEAR          INFLUENZA VACCINE  Completed    08/11/2022  Imm Admin: Flu Vaccine Intradermal Quad 18-64YR    10/14/2018  Imm Admin: Influenza, Unspecified          Pneumococcal Vaccine 0-64 (Series Information) Aged Out    No completion, postpone, or frequency change history exists for this topic.              Immunization History   Administered Date(s) Administered   • COVID-19 (PFIZER) PURPLE CAP 01/06/2021, 01/27/2021, 01/14/2022   • DTP / " "HiB 05/07/1996   • DTaP, Unspecified 01/07/1999   • Flu Vaccine Intradermal Quad 18-64YR 08/11/2022   • Hep B, Unspecified 05/07/1996   • Influenza, Unspecified 10/14/2018   • MMR 05/07/1996, 01/07/1999   • OPV 01/07/1999   • Td 06/19/2006   • Varicella 03/23/2001       Review of Systems   Constitutional: Negative for activity change, appetite change and fatigue.   Eyes: Negative for visual disturbance.   Respiratory: Negative for cough, chest tightness, shortness of breath and wheezing.    Cardiovascular: Negative for chest pain, palpitations and leg swelling.   Gastrointestinal: Negative for abdominal pain, constipation, diarrhea, nausea and vomiting.   Endocrine: Negative for polydipsia and polyuria.   Genitourinary: Negative for difficulty urinating and menstrual problem.   Musculoskeletal: Negative for arthralgias and myalgias.   Skin: Negative for rash.   Neurological: Negative for dizziness, weakness, light-headedness and headaches.   Psychiatric/Behavioral: Negative for agitation and dysphoric mood. The patient is not nervous/anxious.        Objective     Vital Signs  /76 (BP Location: Left arm, Patient Position: Sitting, Cuff Size: Adult)   Pulse 94   Temp 96.8 °F (36 °C) (Temporal)   Resp 18   Ht 158.8 cm (62.5\")   Wt 114 kg (250 lb 6.4 oz)   SpO2 98%   BMI 45.07 kg/m²   Estimated body mass index is 45.07 kg/m² as calculated from the following:    Height as of this encounter: 158.8 cm (62.5\").    Weight as of this encounter: 114 kg (250 lb 6.4 oz).    Class 3 Severe Obesity (BMI >=40). Obesity-related health conditions include the following: none. Obesity is worsening. BMI is is above average; BMI management plan is completed. We discussed portion control, increasing exercise and pharmacologic options including phentermine.      Physical Exam  Constitutional:       Appearance: Normal appearance. She is obese.   HENT:      Head: Normocephalic and atraumatic.      Right Ear: Ear canal and " external ear normal.      Left Ear: Ear canal and external ear normal.      Nose: Nose normal.      Mouth/Throat:      Mouth: Mucous membranes are moist.      Pharynx: Oropharynx is clear.   Eyes:      Conjunctiva/sclera: Conjunctivae normal.   Cardiovascular:      Rate and Rhythm: Normal rate and regular rhythm.      Pulses: Normal pulses.      Heart sounds: Normal heart sounds.   Pulmonary:      Effort: Pulmonary effort is normal.      Breath sounds: Normal breath sounds.   Abdominal:      Palpations: Abdomen is soft.   Musculoskeletal:         General: Normal range of motion.      Cervical back: Normal range of motion and neck supple.   Skin:     General: Skin is warm and dry.   Neurological:      Mental Status: She is alert and oriented to person, place, and time.   Psychiatric:         Mood and Affect: Mood normal.         Behavior: Behavior normal.         Thought Content: Thought content normal.         Judgment: Judgment normal.          Assessment and Plan     Diagnoses and all orders for this visit:    1. Bipolar 2 disorder (HCC) (Primary)  Assessment & Plan:  Psychological condition is improving with treatment.  Continue current treatment regimen.  Psychological condition  will be reassessed at the next regular appointment.  She is followed by AYDE Plascencia As well as a therapist. She feels her symptoms are currently well controlled on current medications which include vraylar        2. Attention deficit disorder, unspecified hyperactivity presence  Assessment & Plan:  Management per SHANTI Plascencia.   She is currently taking focalin 10mg every evening and focalin XR 15mg every morning. She is going to discuss with Camille that she feels the focalin XR works better for her in hopes of making some med changes      3. Annual physical exam  -     Lipid Panel; Future  -     TSH Rfx On Abnormal To Free T4; Future  -     CBC & Differential; Future  -     Comprehensive Metabolic Panel; Future  -      HIV-1 / O / 2 Ag / Antibody 4th Generation; Future  -     Hepatitis C Antibody; Future  -     Urinalysis With Microscopic If Indicated (No Culture) - Urine, Clean Catch; Future  -     Hemoglobin A1c; Future  -     Hemoglobin A1c  -     Hepatitis C Antibody  -     HIV-1 / O / 2 Ag / Antibody 4th Generation  -     Comprehensive Metabolic Panel  -     CBC & Differential  -     Lipid Panel  -     TSH Rfx On Abnormal To Free T4  -     Urinalysis With Microscopic If Indicated (No Culture) - Urine, Clean Catch    4. Morbid (severe) obesity due to excess calories (HCC)  Assessment & Plan:  Patient's (Body mass index is 45.07 kg/m².) indicates that they are morbidly obese (BMI > 40 or > 35 with obesity - related health condition) with health conditions that include none . Weight is worsening. BMI is is above average; BMI management plan is completed. We discussed portion control and increasing exercise.  She voices frustration that she feels like she doesn't eat very much and continues to gain wait, but she also admittedly binge eats some days which she is working on with her mental health professional. She has taken phentermine in the past with some good results and would like to revisit that option.   We will obtain blood work today and if all is normal will proceed with initiation of phentermine.        5. Primary insomnia  Assessment & Plan:  She is currently taking lunesta 3mg nightly with good results. This is managed by Camille MOSER        Follow Up  No follow-ups on file.    SHANTI Cooney

## 2022-12-30 NOTE — ASSESSMENT & PLAN NOTE
Management per Camille Walker, SHANTI.   She is currently taking focalin 10mg every evening and focalin XR 15mg every morning. She is going to discuss with Camille that she feels the focalin XR works better for her in hopes of making some med changes

## 2022-12-30 NOTE — PROGRESS NOTES
Venipuncture Blood Specimen Collection  Venipuncture performed in right arm by Citlali Ordonez MA with good hemostasis. Patient tolerated the procedure well without complications.   12/30/22   Citlali Ordonez MA

## 2022-12-31 LAB
ALBUMIN SERPL-MCNC: 4.4 G/DL (ref 3.9–5)
ALBUMIN/GLOB SERPL: 1.8 {RATIO} (ref 1.2–2.2)
ALP SERPL-CCNC: 79 IU/L (ref 44–121)
ALT SERPL-CCNC: 24 IU/L (ref 0–32)
APPEARANCE UR: ABNORMAL
AST SERPL-CCNC: 19 IU/L (ref 0–40)
BASOPHILS # BLD AUTO: 0 X10E3/UL (ref 0–0.2)
BASOPHILS NFR BLD AUTO: 0 %
BILIRUB SERPL-MCNC: 0.5 MG/DL (ref 0–1.2)
BILIRUB UR QL STRIP: NEGATIVE
BUN SERPL-MCNC: 7 MG/DL (ref 6–20)
BUN/CREAT SERPL: 10 (ref 9–23)
CALCIUM SERPL-MCNC: 9.2 MG/DL (ref 8.7–10.2)
CHLORIDE SERPL-SCNC: 104 MMOL/L (ref 96–106)
CHOLEST SERPL-MCNC: 150 MG/DL (ref 100–199)
CO2 SERPL-SCNC: 21 MMOL/L (ref 20–29)
COLOR UR: YELLOW
CREAT SERPL-MCNC: 0.67 MG/DL (ref 0.57–1)
EGFRCR SERPLBLD CKD-EPI 2021: 123 ML/MIN/1.73
EOSINOPHIL # BLD AUTO: 0.1 X10E3/UL (ref 0–0.4)
EOSINOPHIL NFR BLD AUTO: 2 %
ERYTHROCYTE [DISTWIDTH] IN BLOOD BY AUTOMATED COUNT: 14 % (ref 11.7–15.4)
GLOBULIN SER CALC-MCNC: 2.5 G/DL (ref 1.5–4.5)
GLUCOSE SERPL-MCNC: 110 MG/DL (ref 70–99)
GLUCOSE UR QL STRIP: NEGATIVE
HBA1C MFR BLD: 5.6 % (ref 4.8–5.6)
HCT VFR BLD AUTO: 39.2 % (ref 34–46.6)
HCV AB S/CO SERPL IA: <0.1 S/CO RATIO (ref 0–0.9)
HDLC SERPL-MCNC: 34 MG/DL
HGB BLD-MCNC: 12.7 G/DL (ref 11.1–15.9)
HGB UR QL STRIP: NEGATIVE
HIV 1+2 AB+HIV1 P24 AG SERPL QL IA: NON REACTIVE
IMM GRANULOCYTES # BLD AUTO: 0 X10E3/UL (ref 0–0.1)
IMM GRANULOCYTES NFR BLD AUTO: 1 %
KETONES UR QL STRIP: NEGATIVE
LDLC SERPL CALC-MCNC: 96 MG/DL (ref 0–99)
LEUKOCYTE ESTERASE UR QL STRIP: NEGATIVE
LYMPHOCYTES # BLD AUTO: 2.7 X10E3/UL (ref 0.7–3.1)
LYMPHOCYTES NFR BLD AUTO: 32 %
MCH RBC QN AUTO: 28.7 PG (ref 26.6–33)
MCHC RBC AUTO-ENTMCNC: 32.4 G/DL (ref 31.5–35.7)
MCV RBC AUTO: 89 FL (ref 79–97)
MICRO URNS: ABNORMAL
MONOCYTES # BLD AUTO: 0.5 X10E3/UL (ref 0.1–0.9)
MONOCYTES NFR BLD AUTO: 6 %
NEUTROPHILS # BLD AUTO: 5 X10E3/UL (ref 1.4–7)
NEUTROPHILS NFR BLD AUTO: 59 %
NITRITE UR QL STRIP: NEGATIVE
PH UR STRIP: 5 [PH] (ref 5–7.5)
PLATELET # BLD AUTO: 312 X10E3/UL (ref 150–450)
POTASSIUM SERPL-SCNC: 3.8 MMOL/L (ref 3.5–5.2)
PROT SERPL-MCNC: 6.9 G/DL (ref 6–8.5)
PROT UR QL STRIP: ABNORMAL
RBC # BLD AUTO: 4.42 X10E6/UL (ref 3.77–5.28)
SODIUM SERPL-SCNC: 140 MMOL/L (ref 134–144)
SP GR UR STRIP: >=1.03 (ref 1–1.03)
TRIGL SERPL-MCNC: 105 MG/DL (ref 0–149)
TSH SERPL DL<=0.005 MIU/L-ACNC: 1.34 UIU/ML (ref 0.45–4.5)
UROBILINOGEN UR STRIP-MCNC: 0.2 MG/DL (ref 0.2–1)
VLDLC SERPL CALC-MCNC: 20 MG/DL (ref 5–40)
WBC # BLD AUTO: 8.3 X10E3/UL (ref 3.4–10.8)

## 2023-01-12 ENCOUNTER — TELEPHONE (OUTPATIENT)
Dept: FAMILY MEDICINE CLINIC | Facility: CLINIC | Age: 28
End: 2023-01-12
Payer: COMMERCIAL

## 2023-01-12 DIAGNOSIS — E66.01 MORBID (SEVERE) OBESITY DUE TO EXCESS CALORIES: Primary | ICD-10-CM

## 2023-01-12 NOTE — TELEPHONE ENCOUNTER
Caller: Gayla Layne    Relationship: Self    Best call back number: [phone]    What medication are you requesting: ADIPEX    If a prescription is needed, what is your preferred pharmacy and phone number: [facility]     Additional notes:  NEW PRESCRIPTION.  PATIENT HAS NO MEDICATION.

## 2023-01-13 NOTE — TELEPHONE ENCOUNTER
Called and spoke with patient and advised her of and she states she will try the shot it has been sent to pharmacy

## 2023-01-18 ENCOUNTER — TELEPHONE (OUTPATIENT)
Dept: FAMILY MEDICINE CLINIC | Facility: CLINIC | Age: 28
End: 2023-01-18
Payer: COMMERCIAL

## 2023-01-18 DIAGNOSIS — E66.01 MORBID (SEVERE) OBESITY DUE TO EXCESS CALORIES: Primary | ICD-10-CM

## 2023-01-18 NOTE — TELEPHONE ENCOUNTER
Caller: Gayla Layne    Relationship: Self    Best call back number: [phone]    What medication are you requesting:     WEIGHT LOSS INJECTION    If a prescription is needed, what is your preferred pharmacy and phone number:      [facility]     Additional notes:    PLEASE CALL PATIENT TO LET HER KNOW THE STATUS

## 2023-01-18 NOTE — TELEPHONE ENCOUNTER
In visit 12/30 you did talk about she had good luck with phentermine. What would you like me to do?

## 2023-01-31 ENCOUNTER — TELEPHONE (OUTPATIENT)
Dept: FAMILY MEDICINE CLINIC | Facility: CLINIC | Age: 28
End: 2023-01-31

## 2023-01-31 DIAGNOSIS — R19.7 DIARRHEA, UNSPECIFIED TYPE: ICD-10-CM

## 2023-01-31 RX ORDER — MONTELUKAST SODIUM 4 MG/1
1 TABLET, CHEWABLE ORAL 2 TIMES DAILY
Qty: 60 TABLET | Refills: 2 | OUTPATIENT
Start: 2023-01-31

## 2023-01-31 NOTE — TELEPHONE ENCOUNTER
Caller: Gayla Layne    Relationship: Self    Best call back number: [phone]    What is the best time to reach you: ANYTIME    Who are you requesting to speak with (clinical staff, provider,  specific staff member):CLINICAL STAFF     Do you know the name of the person who called: THE PATIENT TATA    What was the call regarding:REQUESTING A CALL BACK WITH THE STATUS OF THE PRIOR AUTHORIZATION ON THE  Semaglutide-Weight Management 0.25 MG/0.5ML solution auto-injector         Do you require a callback:YES

## 2023-02-02 NOTE — TELEPHONE ENCOUNTER
PA has been completed and pt aware. Told her to reach out to pharmacy tomorrow or Monday if they don't call her first.